# Patient Record
Sex: MALE | Race: WHITE | NOT HISPANIC OR LATINO | URBAN - METROPOLITAN AREA
[De-identification: names, ages, dates, MRNs, and addresses within clinical notes are randomized per-mention and may not be internally consistent; named-entity substitution may affect disease eponyms.]

---

## 2021-05-31 ENCOUNTER — OFFICE VISIT (OUTPATIENT)
Dept: URGENT CARE | Facility: CLINIC | Age: 26
End: 2021-05-31

## 2021-05-31 VITALS — RESPIRATION RATE: 16 BRPM | OXYGEN SATURATION: 98 % | HEART RATE: 83 BPM | TEMPERATURE: 97.4 F

## 2021-05-31 DIAGNOSIS — R59.1 LYMPHADENOPATHY: Primary | ICD-10-CM

## 2021-05-31 PROCEDURE — G0382 LEV 3 HOSP TYPE B ED VISIT: HCPCS | Performed by: PHYSICIAN ASSISTANT

## 2021-05-31 NOTE — PATIENT INSTRUCTIONS
Ibuprofen   Benadryl   If no improvement in 2-3 days f/w with PCP   If anything changes or worsens - shortness of breath or trouble breathing, dizziness, nausea/vomiting, or worsening symptoms - go to the ER

## 2021-05-31 NOTE — PROGRESS NOTES
NAME: Viviane Field is a 31 y o  male  : 1995    MRN: [id number]      Assessment and Plan   Lymphadenopathy [R59 1]  1  Lymphadenopathy         Discussed with patient likely lymphadenopathy secondary to the vaccine  Not likely to be allergic reaction as it started 2 days after the vaccine  He has no signs of any respiratory distress and no evidence of any posterior oropharyngeal edema  Discussed trial of ibuprofen and Benadryl and monitoring symptoms  Discussed red flag symptoms and if they were to occur he should go to the ER  She would expect this to resolve the next few days but if not follow-up with PCP  He acknowledges      Patient Instructions     Patient Instructions   Ibuprofen   Benadryl   If no improvement in 2-3 days f/w with PCP   If anything changes or worsens - shortness of breath or trouble breathing, dizziness, nausea/vomiting, or worsening symptoms - go to the ER     Proceed to ER if symptoms worsen  Chief Complaint     Chief Complaint   Patient presents with    Sore Throat     Last night "my throat feel like it was closing"  Not a sore throat, just feels like it contricting, swollen glands  Denies any other s/s at this time  Covid 1st dose on friday  History of Present Illness    Patient with no reported past medical history presents complaining of swollen neck x1 day  States he had his 1st dose of the COVID vaccine on Friday  Reports when lying down last night he started to notice that his neck felt swollen and tight  He states he has no sore throat, cough, congestion, fevers or chills  Denies any shortness of breath, wheezing, stridor or dyspnea  He has not taken anything over-the-counter  Reports he feels that his lymph nodes in his neck or sore  Reports no difficulty swallowing but states feels "tight" when swallowing  Denies any chest pain, palpitations, nausea, vomiting or diarrhea  No rashes anywhere    Denies any other new foods, products or medications  Review of Systems   Review of Systems   Constitutional: Negative for chills, diaphoresis and fever  HENT: Negative for congestion, sore throat, trouble swallowing and voice change  Lymphadenopathy    Respiratory: Negative for cough, choking, chest tightness, shortness of breath, wheezing and stridor  Cardiovascular: Negative for chest pain and palpitations  Gastrointestinal: Negative for abdominal pain, diarrhea, nausea and vomiting  Skin: Negative for rash  Neurological: Negative for dizziness, weakness, light-headedness, numbness and headaches  Current Medications     No current outpatient medications on file  Current Allergies     Allergies as of 05/31/2021    (No Known Allergies)              History reviewed  No pertinent past medical history  History reviewed  No pertinent surgical history  History reviewed  No pertinent family history  Medications have been verified  The following portions of the patient's history were reviewed and updated as appropriate: allergies, current medications, past family history, past medical history, past social history, past surgical history and problem list     Objective   Pulse 83   Temp (!) 97 4 °F (36 3 °C)   Resp 16   SpO2 98%      Physical Exam     Physical Exam  Vitals signs and nursing note reviewed  Constitutional:       General: He is not in acute distress  Appearance: He is well-developed  He is not ill-appearing, toxic-appearing or diaphoretic  HENT:      Head:      Comments: Posterior oropharynx is mildly erythematous with cobblestoning  No posterior oropharyngeal edema, tonsillar hypertrophy, exudates  Uvula is normal sized in midline  No tongue swelling  Soft palate is equal   No swelling to the floor of the mouth  Neck:      Musculoskeletal: Normal range of motion and neck supple  Comments: Mild bilateral tonsillar adenopathy  No posterior chain adenopathy    No Supraclavicular lymphadenopathy  Cardiovascular:      Rate and Rhythm: Normal rate and regular rhythm  Pulmonary:      Effort: Pulmonary effort is normal  No respiratory distress  Breath sounds: No stridor  Comments: Speaking in full sentences without difficulty  Lymphadenopathy:      Head:      Right side of head: No preauricular, posterior auricular or occipital adenopathy  Left side of head: No preauricular, posterior auricular or occipital adenopathy  Cervical: Cervical adenopathy present  Upper Body:      Right upper body: No supraclavicular or axillary adenopathy  Left upper body: No supraclavicular or axillary adenopathy  Neurological:      Mental Status: He is alert